# Patient Record
Sex: MALE | Race: BLACK OR AFRICAN AMERICAN | Employment: PART TIME | URBAN - METROPOLITAN AREA
[De-identification: names, ages, dates, MRNs, and addresses within clinical notes are randomized per-mention and may not be internally consistent; named-entity substitution may affect disease eponyms.]

---

## 2019-07-06 ENCOUNTER — HOSPITAL ENCOUNTER (EMERGENCY)
Age: 39
Discharge: HOME | End: 2019-07-06
Attending: EMERGENCY MEDICINE

## 2019-07-06 VITALS
BODY MASS INDEX: 27.9 KG/M2 | WEIGHT: 229.1 LBS | RESPIRATION RATE: 16 BRPM | DIASTOLIC BLOOD PRESSURE: 98 MMHG | HEIGHT: 76 IN | SYSTOLIC BLOOD PRESSURE: 140 MMHG | TEMPERATURE: 98.9 F | HEART RATE: 69 BPM | OXYGEN SATURATION: 96 %

## 2019-07-06 DIAGNOSIS — R03.0 ELEVATED BLOOD PRESSURE READING: ICD-10-CM

## 2019-07-06 DIAGNOSIS — L02.01 FACIAL ABSCESS: Primary | ICD-10-CM

## 2019-07-06 DIAGNOSIS — L72.3 SEBACEOUS CYST: ICD-10-CM

## 2019-07-06 PROCEDURE — 99283 EMERGENCY DEPT VISIT LOW MDM: CPT

## 2019-07-06 PROCEDURE — 87070 CULTURE OTHR SPECIMN AEROBIC: CPT

## 2019-07-06 PROCEDURE — 87205 SMEAR GRAM STAIN: CPT

## 2019-07-06 PROCEDURE — 4500000023 HC ED LEVEL 3 PROCEDURE

## 2019-07-06 RX ORDER — SULFAMETHOXAZOLE AND TRIMETHOPRIM 800; 160 MG/1; MG/1
1 TABLET ORAL 2 TIMES DAILY
Qty: 20 TABLET | Refills: 0 | Status: SHIPPED | OUTPATIENT
Start: 2019-07-06 | End: 2019-07-16

## 2019-07-06 RX ORDER — LIDOCAINE HYDROCHLORIDE AND EPINEPHRINE 10; 10 MG/ML; UG/ML
20 INJECTION, SOLUTION INFILTRATION; PERINEURAL ONCE
Status: DISCONTINUED | OUTPATIENT
Start: 2019-07-06 | End: 2019-07-06 | Stop reason: HOSPADM

## 2019-07-06 RX ORDER — CEPHALEXIN 500 MG/1
500 CAPSULE ORAL 3 TIMES DAILY
Qty: 30 CAPSULE | Refills: 0 | Status: SHIPPED | OUTPATIENT
Start: 2019-07-06

## 2019-07-06 ASSESSMENT — PAIN DESCRIPTION - ORIENTATION: ORIENTATION: LEFT

## 2019-07-06 ASSESSMENT — PAIN DESCRIPTION - LOCATION: LOCATION: FACE

## 2019-07-06 ASSESSMENT — PAIN SCALES - GENERAL: PAINLEVEL_OUTOF10: 6

## 2019-07-06 NOTE — ED NOTES
Pt states understanding of d/c instructions and medications. Pt alert and oriented with steady gait upon discharge. Wound care by RT.      Alyssa Pradhan RN  07/06/19 Jerel Rashid RN  07/06/19 [id number]

## 2019-07-07 NOTE — ED PROVIDER NOTES
TRIAGE CHIEF COMPLAINT:   Chief Complaint   Patient presents with    Abscess         HPI: Sudhir Mcdonald is a 35 y.o. male who presents to the Emergency Department with complaint of facial abscess. Patient states he has had a swollen area on his jaw left side for a few months. It is been getting larger over the last week. He denies any drainage. He does have a history of recurrent abscess in this area. Denies fever or chills. No history of trauma. Denies any dental pain or sore throat. Denies neck pain. REVIEW OF SYSTEMS:  6 systems reviewed. Pertinent positives per HPI. Otherwise noted to be negative. Nursing notes reviewed and agree with above. Past medical/surgical history reviewed. MEDICATIONS   Discharge Medication List as of 7/6/2019  2:31 PM      START taking these medications    Details   cephALEXin (KEFLEX) 500 MG capsule Take 1 capsule by mouth 3 times daily, Disp-30 capsule, R-0Print      sulfamethoxazole-trimethoprim (BACTRIM DS) 800-160 MG per tablet Take 1 tablet by mouth 2 times daily for 10 days, Disp-20 tablet, R-0Print         CONTINUE these medications which have NOT CHANGED    Details   losartan (COZAAR) 25 MG tablet Take 25 mg by mouth daily 7/23/16-Out of x 3 days      metoprolol (TOPROL-XL) 25 MG XL tablet Take 25 mg by mouth 2 times daily 7/23/16-out of x 3 days               ALLERGIES No Known Allergies      BP (!) 140/98   Pulse 69   Temp 98.9 °F (37.2 °C) (Oral)   Resp 16   Ht 6' 4\" (1.93 m)   Wt 103.9 kg (229 lb 1.6 oz)   SpO2 96%   BMI 27.89 kg/m²   General:  No acute distress. Non toxic appearance  Head:   Normocephalic and atraumatic  Eyes:   Conjunctiva clear, BRANDT, EOM's intact. Sclera anicteric. ENT:   Mucous membranes moist.  No trismus. Oropharynx shows no evidence of infection. No dental tenderness. Large fluctuant abscess noted on the outside of the left cheek. No drainage. No redness or streaking. This area is nontender. Neck:   Supple.  No adenopathy or jugular venous distension  Lungs/Chest:  No respiratory distress  CVS:   Regular rate and rhythm  Abdomen:  Deferred  Extremities:  Full range of motion  Skin:   No rashes or lesions to exposed skin  Back:   Deferred  Neuro:  Alert and OX3. Speech clear and appropriate. No focal weakness. Normal sensation in all extremities. Gait normal.  Psych:   Affect normal. Mood normal        RADIOLOGY:      LAB    PROCEDURES:  The abscess was anesthetized with 1% lidocaine with epinephrine using 1.5 cc. It was incised with a #11 blade after cleaning with Hibiclens draining a large amount of purulent material as well as sebaceous material.  Wound culture was sent and is pending. It was explored and there were no loculations. It was packed loosely with iodoform gauze and covered with sterile dressing. ED COURSE / MDM:  76-year-old male with infected sebaceous cyst on the left cheek now with abscess status post incision and drainage. Wound culture was sent and is pending. He will be treated with Keflex and Bactrim. Recommended follow-up with general surgery when the area has healed  for consideration of removing the cyst.  Advised Tylenol or ibuprofen if needed for pain. I discussed with Roseann Stephens the results of evaluation in the Emergency Department, diagnosis, care and prognosis. The plan is to discharge to home. The patient is in agreement with the plan and questions have been answered. I also discussed with the patient and/or family the reasons which may require a return visit and the importance of follow-up care.        (Please note that portions of this note may have been completed with a voice recognition program.  Efforts were made to edit the dictation but occasionally words are mis-transcribed)        FINAL IMPRESSION:  1 -- facial abscess  2 -- sebaceous cyst  3 --elevated blood pressure reading                   Fabrice Hebert MD  07/07/19 [id number]

## 2019-07-09 LAB
GRAM STAIN RESULT: ABNORMAL
WOUND/ABSCESS: ABNORMAL